# Patient Record
Sex: MALE | Race: BLACK OR AFRICAN AMERICAN | NOT HISPANIC OR LATINO | URBAN - METROPOLITAN AREA
[De-identification: names, ages, dates, MRNs, and addresses within clinical notes are randomized per-mention and may not be internally consistent; named-entity substitution may affect disease eponyms.]

---

## 2017-03-08 ENCOUNTER — OUTPATIENT (OUTPATIENT)
Dept: OUTPATIENT SERVICES | Facility: HOSPITAL | Age: 42
LOS: 1 days | Discharge: HOME | End: 2017-03-08

## 2017-06-27 DIAGNOSIS — I10 ESSENTIAL (PRIMARY) HYPERTENSION: ICD-10-CM

## 2017-06-27 DIAGNOSIS — E55.9 VITAMIN D DEFICIENCY, UNSPECIFIED: ICD-10-CM

## 2017-07-18 ENCOUNTER — OUTPATIENT (OUTPATIENT)
Dept: OUTPATIENT SERVICES | Facility: HOSPITAL | Age: 42
LOS: 1 days | Discharge: HOME | End: 2017-07-18

## 2017-07-18 DIAGNOSIS — I10 ESSENTIAL (PRIMARY) HYPERTENSION: ICD-10-CM

## 2017-07-18 DIAGNOSIS — E78.2 MIXED HYPERLIPIDEMIA: ICD-10-CM

## 2018-03-02 ENCOUNTER — OUTPATIENT (OUTPATIENT)
Dept: OUTPATIENT SERVICES | Facility: HOSPITAL | Age: 43
LOS: 1 days | Discharge: HOME | End: 2018-03-02

## 2018-03-02 DIAGNOSIS — E55.9 VITAMIN D DEFICIENCY, UNSPECIFIED: ICD-10-CM

## 2018-03-02 DIAGNOSIS — I10 ESSENTIAL (PRIMARY) HYPERTENSION: ICD-10-CM

## 2019-08-21 ENCOUNTER — OUTPATIENT (OUTPATIENT)
Dept: OUTPATIENT SERVICES | Facility: HOSPITAL | Age: 44
LOS: 1 days | Discharge: HOME | End: 2019-08-21

## 2019-08-21 DIAGNOSIS — M06.4 INFLAMMATORY POLYARTHROPATHY: ICD-10-CM

## 2019-08-21 DIAGNOSIS — D64.9 ANEMIA, UNSPECIFIED: ICD-10-CM

## 2019-08-21 DIAGNOSIS — Z00.00 ENCOUNTER FOR GENERAL ADULT MEDICAL EXAMINATION WITHOUT ABNORMAL FINDINGS: ICD-10-CM

## 2022-08-16 PROBLEM — Z00.00 ENCOUNTER FOR PREVENTIVE HEALTH EXAMINATION: Status: ACTIVE | Noted: 2022-08-16

## 2022-08-23 ENCOUNTER — APPOINTMENT (OUTPATIENT)
Dept: PAIN MANAGEMENT | Facility: CLINIC | Age: 47
End: 2022-08-23

## 2022-08-29 ENCOUNTER — APPOINTMENT (OUTPATIENT)
Dept: PAIN MANAGEMENT | Facility: CLINIC | Age: 47
End: 2022-08-29

## 2022-08-29 VITALS
SYSTOLIC BLOOD PRESSURE: 150 MMHG | DIASTOLIC BLOOD PRESSURE: 102 MMHG | WEIGHT: 235 LBS | HEIGHT: 73 IN | HEART RATE: 72 BPM | BODY MASS INDEX: 31.14 KG/M2

## 2022-08-29 DIAGNOSIS — Z86.79 PERSONAL HISTORY OF OTHER DISEASES OF THE CIRCULATORY SYSTEM: ICD-10-CM

## 2022-08-29 DIAGNOSIS — Z56.0 UNEMPLOYMENT, UNSPECIFIED: ICD-10-CM

## 2022-08-29 DIAGNOSIS — Z78.9 OTHER SPECIFIED HEALTH STATUS: ICD-10-CM

## 2022-08-29 DIAGNOSIS — F17.200 NICOTINE DEPENDENCE, UNSPECIFIED, UNCOMPLICATED: ICD-10-CM

## 2022-08-29 DIAGNOSIS — M54.50 LOW BACK PAIN, UNSPECIFIED: ICD-10-CM

## 2022-08-29 DIAGNOSIS — M51.17 INTERVERTEBRAL DISC DISORDERS WITH RADICULOPATHY, LUMBOSACRAL REGION: ICD-10-CM

## 2022-08-29 PROCEDURE — 99214 OFFICE O/P EST MOD 30 MIN: CPT

## 2022-08-29 SDOH — ECONOMIC STABILITY - INCOME SECURITY: UNEMPLOYMENT, UNSPECIFIED: Z56.0

## 2022-08-29 NOTE — DISCUSSION/SUMMARY
[de-identified] : Patient had a MRI that shows a radicular component along with pain referred into the lower extremity. Patient has trialed rehab (Home exercise, physical therapy or chiropractic care) and medications. I will schedule a caudal epidural steroid injection.\par \par The patient has severe anxiety of procedures that necessitates monitored anesthesia care (MAC). The procedure performed will be close to major nerves, arteries, and spinal cord and/or joint structures. Due to the proximity of these structures, we need the patient to be still during the procedure. With the help of MAC, this will be safely achieved and decrease the risk of any complications.\par \par \par Followup 1-2 weeks post injection for assessment of efficacy and further recommendations.\par \par \par Continue with activity and home exercise program as tolerated.\par \par \par I personally reviewed with the PA, this patient's history and physical exam findings, as documented above. I have discussed the relevant areas of concern, having direct implications to the presenting problems and illnesses, and I have personally examined all pertinent and positive and negative findings, which impact on the prior pain management treatment.\par \par Zaria Feldman PA-C\par Liana Rogers MD\par \par \par

## 2022-08-29 NOTE — HISTORY OF PRESENT ILLNESS
[FreeTextEntry1] : ORIGINAL PRESENTATION: Mr. Rae is a 45 year-old male complaining of back pain. He presented to our walk-in on 11/27/2020 with a chief complaint of lower back pain which he has a longstanding history of. He presents today with complaints of lower back pain radiating into his right leg past the knee with occasional numbness and tingling. He states his pain is progressively gotten worse since his last encounter. An MRI of the lumbar spine was reviewed and documented below.\par \par The pain started after no known injury. The patient has had this pain for 6 months. Patient describes pain as moderate to severe being an 8/10 on the pain scale. During the last month the pain has been nearly constant with symptoms worsening in no typical pattern. Pain described as sharp, pressure-like, shooting. Pain is increased with walking, exercise. Pain is not changed with lying down, standing, sitting, relaxation, coughing/sneezing, bowel movements. Bowel or bladder habits have not changed.\par \par ACTIVITIES: Patient could walk 2 blocks before the pain starts. Patient can stand 1 hour before pain starts. The patient sometimes lies down because of pain. Patient uses no assistive walking device at this time. Patient has difficulty going to work, doing outside work or shopping, socializing with friends, participating in recreational activities, exercising at this time.\par \par PRIOR PAIN TREATMENTS: None noted.\par \par Prior Pain Medications: None.\par \par \par TODAY: The reason for the visit is lumbar radicular pain.   Please recall, he was last seen in  April 2021.  At that time, he underwent a left L5-S1 SNRI in April 2021 which provided excellent relief of his  left-sided radicular pain up until a month ago.  He states a month ago, the pain was gradually returning and presents today with moderate to severe pain.   Please recall, he also underwent a right L4-L5 SNRI in March 2021 with excellent relief as well. \par \par Today, he notes the pain radiates across the lower back into both of his lower extremities,  right >left.   He states the pain radiates into bilateral upper thighs with occasional pain radiating into the left foot.  At this time, since his pain does radiate into his legs bilaterally, I did discuss with him in trying a caudal epidural injection instead.  He is willing to proceed.

## 2022-08-29 NOTE — DATA REVIEWED
[FreeTextEntry1] : MRI lumbar spine dated 12/16/2020 shows mild disc bulging at L2-3 producing a mild bilateral neural foraminal narrowing. Mild spinal stenosis and bilateral neural foraminal narrowing at L3-4. Broad-based central disc protrusion at L4-5 which moderately effaces the anterior thecal sac. Moderate spinal stenosis and moderate bilateral neural foraminal narrowing is also seen at this level. Left paracentral disc protrusion at L5-S1 which mildly indents the anterior thecal sac on the left, produces left lateral recess stenosis and contacts the left S1 nerve root. Moderate bilateral neural foraminal narrowing is also seen at this level.\par

## 2022-08-29 NOTE — ASSESSMENT
[FreeTextEntry1] : Mr. Rae is a 47-year-old male presenting for revisit encounter with continued complaints of lower back pain  With radicular pain into lower extremities bilaterally.  He previously had a left L4-S1 SNRI in March 2021 which provided him with excellent relief up until recently.  Currently, he is having lower back pain radiating into both legs.  At this time, we will move forward with a caudal epidural injection x1 with MAC.  A follow-up will be scheduled after injection for re-evaluation. All of his questions have been answered any understood our conversation well.\par

## 2022-09-13 ENCOUNTER — APPOINTMENT (OUTPATIENT)
Dept: PAIN MANAGEMENT | Facility: CLINIC | Age: 47
End: 2022-09-13

## 2024-12-02 ENCOUNTER — APPOINTMENT (OUTPATIENT)
Dept: ORTHOPEDIC SURGERY | Facility: CLINIC | Age: 49
End: 2024-12-02
Payer: MEDICAID

## 2024-12-02 DIAGNOSIS — M17.0 BILATERAL PRIMARY OSTEOARTHRITIS OF KNEE: ICD-10-CM

## 2024-12-02 PROCEDURE — 99203 OFFICE O/P NEW LOW 30 MIN: CPT | Mod: 25

## 2024-12-02 PROCEDURE — 20610 DRAIN/INJ JOINT/BURSA W/O US: CPT | Mod: 50

## 2024-12-02 PROCEDURE — 73562 X-RAY EXAM OF KNEE 3: CPT | Mod: 50

## 2025-04-07 ENCOUNTER — APPOINTMENT (OUTPATIENT)
Dept: ORTHOPEDIC SURGERY | Facility: CLINIC | Age: 50
End: 2025-04-07
Payer: MEDICAID

## 2025-04-07 ENCOUNTER — APPOINTMENT (OUTPATIENT)
Dept: PAIN MANAGEMENT | Facility: CLINIC | Age: 50
End: 2025-04-07

## 2025-04-07 DIAGNOSIS — M17.0 BILATERAL PRIMARY OSTEOARTHRITIS OF KNEE: ICD-10-CM

## 2025-04-07 PROCEDURE — 20610 DRAIN/INJ JOINT/BURSA W/O US: CPT | Mod: 50

## 2025-04-07 PROCEDURE — 99213 OFFICE O/P EST LOW 20 MIN: CPT | Mod: 25

## 2025-08-26 ENCOUNTER — APPOINTMENT (OUTPATIENT)
Dept: ORTHOPEDIC SURGERY | Facility: CLINIC | Age: 50
End: 2025-08-26
Payer: MEDICAID

## 2025-08-26 DIAGNOSIS — M17.0 BILATERAL PRIMARY OSTEOARTHRITIS OF KNEE: ICD-10-CM

## 2025-08-26 PROCEDURE — 20610 DRAIN/INJ JOINT/BURSA W/O US: CPT | Mod: 50

## 2025-08-26 PROCEDURE — 99203 OFFICE O/P NEW LOW 30 MIN: CPT | Mod: 25

## 2025-08-26 RX ORDER — MELOXICAM 15 MG/1
15 TABLET ORAL
Qty: 30 | Refills: 2 | Status: ACTIVE | COMMUNITY
Start: 2025-08-26 | End: 1900-01-01